# Patient Record
Sex: MALE | Race: BLACK OR AFRICAN AMERICAN | NOT HISPANIC OR LATINO | Employment: UNEMPLOYED | URBAN - METROPOLITAN AREA
[De-identification: names, ages, dates, MRNs, and addresses within clinical notes are randomized per-mention and may not be internally consistent; named-entity substitution may affect disease eponyms.]

---

## 2019-10-17 ENCOUNTER — OFFICE VISIT (OUTPATIENT)
Dept: FAMILY MEDICINE | Facility: CLINIC | Age: 34
End: 2019-10-17
Payer: MEDICAID

## 2019-10-17 VITALS
SYSTOLIC BLOOD PRESSURE: 109 MMHG | HEART RATE: 54 BPM | OXYGEN SATURATION: 98 % | WEIGHT: 181 LBS | HEIGHT: 67 IN | TEMPERATURE: 97.4 F | DIASTOLIC BLOOD PRESSURE: 59 MMHG | BODY MASS INDEX: 28.41 KG/M2

## 2019-10-17 DIAGNOSIS — G47.33 OSA (OBSTRUCTIVE SLEEP APNEA): ICD-10-CM

## 2019-10-17 DIAGNOSIS — J35.1 TONSILLAR HYPERTROPHY: ICD-10-CM

## 2019-10-17 DIAGNOSIS — H81.13 BENIGN PAROXYSMAL POSITIONAL VERTIGO DUE TO BILATERAL VESTIBULAR DISORDER: ICD-10-CM

## 2019-10-17 DIAGNOSIS — J01.10 ACUTE NON-RECURRENT FRONTAL SINUSITIS: Primary | ICD-10-CM

## 2019-10-17 PROCEDURE — 99203 OFFICE O/P NEW LOW 30 MIN: CPT | Performed by: FAMILY MEDICINE

## 2019-10-17 RX ORDER — AMOXICILLIN 875 MG
875 TABLET ORAL 2 TIMES DAILY
Qty: 28 TABLET | Refills: 0 | Status: SHIPPED | OUTPATIENT
Start: 2019-10-17 | End: 2019-10-31

## 2019-10-17 RX ORDER — PREDNISONE 20 MG/1
TABLET ORAL
Qty: 10 TABLET | Refills: 0 | Status: SHIPPED | OUTPATIENT
Start: 2019-10-17

## 2019-10-17 SDOH — HEALTH STABILITY: MENTAL HEALTH: HOW OFTEN DO YOU HAVE A DRINK CONTAINING ALCOHOL?: NEVER

## 2019-10-17 ASSESSMENT — MIFFLIN-ST. JEOR: SCORE: 1721.64

## 2019-10-17 ASSESSMENT — PAIN SCALES - GENERAL: PAINLEVEL: NO PAIN (0)

## 2019-10-17 NOTE — PATIENT INSTRUCTIONS
Patient Education     Heart Failure: Dealing with Sleep Problems     An airflow device may be needed if you have sleep apnea.   If you have heart failure and you re not sleeping well, there are many possible reasons. Many people with heart failure also have sleep apnea. This is a condition that causes snoring and brief periods of not breathing. Age, certain medicines, and not getting enough exercise can also affect sleep. Be sure to tell your healthcare provider if you re having sleep problems.  Tips for sleeping better  If shortness of breath keeps you awake, your healthcare team needs to know. Tell them if you can t lie flat or need to sleep propped up on pillows. Or tell them if you can only sleep sitting up in a chair or recliner. If nighttime shortness of breath gets worse, bring this to the team s attention. You may be accumulating fluid and need more diuretic medicine. If you have other sleep problems not related to shortness of breath, these tips may help:    Do deep breathing in bed. This will relax you and help you fall asleep.    Don t drink caffeine any later than noon.    Try to go to sleep and wake up around the same time every day. This helps your body set up a sleep cycle.    Avoid napping. This can affect your sleep cycle.    Pull window shades down. If the room isn t dark enough, get blackout shades.    Keep pets out of the bedroom if they bother you at night.    Wear comfortable, loose pajamas. Pajamas that fit tightly may make you feel like it's harder to breathe.    If you take medicines at bedtime, talk with your healthcare provider about changing this. Certain medicines may be keeping you awake. Or they may cause you to wake up often to use the bathroom.    Talk with your healthcare provider about taking over-the-counter sleep aids. Some OTC medicines can interact with your prescribed medicines. Or they may have salts in them that cause you to retain fluids.  Do you have sleep apnea?  You  may not know you have sleep apnea unless someone notices that you have irregular breathing, gasping at night, or snoring while you sleep. You should get checked for this condition. If you have it, treatment can improve your health. Treatment can also ease the stress on your heart and body.  Your healthcare provider may prescribe a CPAP?(continuous positive airway pressure) or BiPap?(bilevel positive airway pressure) device. The machine sends a gentle flow of air through a nasal mask while you sleep. This air goes through your nose and into your lungs, keeping airways open.  Tips for using CPAP and BiPap    If your mask doesn t fit or feel right, talk with your healthcare provider or the vendor about adjusting it or trying a new one.    If you have allergies or other problems that block your nose, get those treated. These devices work best if your nose is clear.    If the device doesn t feel good or work well at first, don t stop using it. Ask your provider or someone from your medical equipment company for ways to help make it work for you.    Newer devices are small, lightweight, and portable. You should take your machine with you when you travel or are not sleeping at home. Some devices have chargeable batteries. They can be taken camping or when sleeping outdoors.    Do not use tap water to fill the water chamber for humidity. Deposits in tap water can build up and affect the machine and your breathing. The product makers usually recommend that you use sterile water or distilled water.    Ask your healthcare provider if you need oxygen along with your CPAP or BiPAP machine. Some people with heart failure need both.    The equipment wears out with time. Make sure your equipment is tested. Get new equipment as often as your healthcare provider recommends.  Date Last Reviewed: 7/1/2016 2000-2018 [employer] [address] [place] [address], [place] [address]. All rights reserved. This information is not  intended as a substitute for professional medical care. Always follow your healthcare professional's instructions.           Patient Education     Obstructive Sleep Apnea  Obstructive sleep apnea is a condition that causes your air passages to become narrowed or blocked during sleep. As a result, breathing stops for short periods. Your body wakes up enough for breathing to begin again, though you don't remember it. The cycle of stopped breathing and brief awakenings can repeat dozens of times a night. This prevents the body from getting to the deeper stages of sleep that are needed for good rest and may cause your body's oxygen level to fall.  Signs of sleep apnea include loud snoring, noisy breathing, and gasping sounds during sleep. Daytime symptoms include waking up tired after a full night's sleep, waking up with headaches, feeling very sleepy or falling asleep during the day, and having problems with memory or concentration.  Risk factors for sleep apnea include:    Being overweight    Being a man, or a woman in menopause    Smoking    Using alcohol or sedating medicines    Having enlarged structures in the nose or throat  Home care  Lifestyle changes that can help treat snoring and sleep apnea include the following:    If you are overweight, lose weight. Talk to your healthcare provider about a weight-loss plan for you.    Avoid alcohol for 3 to 4 hours before bedtime. Avoid sedating medications. Ask your healthcare provider about the medicines you take.    If you smoke, talk to your healthcare provider about ways to quit.    Sleep on your side. This can help prevent gravity from pulling relaxed throat tissues into your breathing passages.    If you have allergies or sinus problems that block your nose, ask your healthcare provider for help.  Follow-up care  Follow up with your healthcare provider, or as advised. A diagnosis of sleep apnea is made with a sleep study. Your healthcare provider can tell you more  about this test.  When to seek medical advice  Sleep apnea can make you more likely to have certain health problems. These include high blood pressure, heart attack, stroke, and sexual dysfunction. If you have sleep apnea, talk to your healthcare provider about the best treatments for you.  Date Last Reviewed: 4/1/2017 2000-2018 [employer]. [address] [place] [address], [place] [address]. All rights reserved. This information is not intended as a substitute for professional medical care. Always follow your healthcare professional's instructions.

## 2019-10-17 NOTE — PROGRESS NOTES
Subjective     Stephen Schafer is a 34 year old male who presents to clinic today for the following health issues:    HPI   Patient comes in today with symptoms of spinning for the past week.  He reports he only have it in the morning when he first woke up.   He reports that he does have sinus congestion, runny nose, fullness and pressure in his ears for the past few weeks.    He denies any fever, has no cough, denies headache.    Patient reports he does snore at night.  He reports his wife does complain that he quit breathing at night.  He reports that throughout the day he is very tired and fatigued.  He does have a large tonsil.    Dizziness  Onset: About a week ago    Description:   Do you feel faint:  yes   Does it feel like the surroundings (bed, room) are moving: YES  Unsteady/off balance: no   Have you passed out or fallen: no     Intensity: moderate, severe    Progression of Symptoms:  same and intermittent    Accompanying Signs & Symptoms:  Heart palpitations: no   Nausea, vomiting: no   Weakness in arms or legs: no   Fatigue: YES  Vision or speech changes: no   Ringing in ears (Tinnitus): no   Hearing Loss: no     History:   Head trauma/concussion hx: no   Previous similar symptoms: no   Recent bleeding history: no     Precipitating factors:   Worse with activity or head movement: no   Any new medications (BP?): no   Alcohol/drug abuse/withdrawal: no     Alleviating factors:   Does staying in a fixed position give relief:  YES    Therapies Tried and outcome: Increased fluid intake and diet watch    Patient Active Problem List   Diagnosis     Benign paroxysmal positional vertigo due to bilateral vestibular disorder     LOUIE (obstructive sleep apnea)     Tonsillar hypertrophy     History reviewed. No pertinent surgical history.    Social History     Tobacco Use     Smoking status: Never Smoker     Smokeless tobacco: Never Used   Substance Use Topics     Alcohol use: Never     Frequency: Never     Family  History   Problem Relation Age of Onset     Hypertension Mother      Diabetes Type 2  Mother      Hyperlipidemia Father          Current Outpatient Medications   Medication Sig Dispense Refill     amoxicillin (AMOXIL) 875 MG tablet Take 1 tablet (875 mg) by mouth 2 times daily for 14 days 28 tablet 0     predniSONE (DELTASONE) 20 MG tablet 2 tab daily for 5 days 10 tablet 0     No Known Allergies    Reviewed and updated as needed this visit by Provider         Review of Systems   ROS COMP: Constitutional, HEENT, cardiovascular, pulmonary, gi and gu systems are negative, except as otherwise noted.      Objective    There were no vitals taken for this visit.  There is no height or weight on file to calculate BMI.  Physical Exam   GENERAL: healthy, alert and no distress  HENT: normal cephalic/atraumatic, ear canals and TM's normal, nose and mouth without ulcers or lesions and tonsillar hypertrophy  NECK: no adenopathy, no asymmetry, masses, or scars and thyroid normal to palpation  RESP: lungs clear to auscultation - no rales, rhonchi or wheezes  CV: regular rate and rhythm, normal S1 S2, no S3 or S4, no murmur, click or rub, no peripheral edema and peripheral pulses strong  ABDOMEN: soft, nontender, no hepatosplenomegaly, no masses and bowel sounds normal  MS: no gross musculoskeletal defects noted, no edema  NEURO: Normal strength and tone, mentation intact and speech normal  PSYCH: mentation appears normal, affect normal/bright    Diagnostic Test Results:  Labs reviewed in Epic  none         Assessment & Plan     1. Acute non-recurrent frontal sinusitis    - predniSONE (DELTASONE) 20 MG tablet; 2 tab daily for 5 days  Dispense: 10 tablet; Refill: 0  - amoxicillin (AMOXIL) 875 MG tablet; Take 1 tablet (875 mg) by mouth 2 times daily for 14 days  Dispense: 28 tablet; Refill: 0    2. LOUIE (obstructive sleep apnea)    - SLEEP EVALUATION & MANAGEMENT REFERRAL - [facility] [phone] (Age  "15 and up); Future    3. Benign paroxysmal positional vertigo due to bilateral vestibular disorder  Advised with home exercise, supportive care.  Increase fluid intake.  4. Tonsillar hypertrophy  She was referred to a sleep study.  If he has a positive sleep study then possibly he may benefit from tonsillectomy      BMI:   Estimated body mass index is 28.24 kg/m  as calculated from the following:    Height as of this encounter: 1.705 m (5' 7.13\").    Weight as of this encounter: 82.1 kg (181 lb).   Weight management plan: Discussed healthy diet and exercise guidelines        Patient Instructions       Patient Education     Heart Failure: Dealing with Sleep Problems     An airflow device may be needed if you have sleep apnea.   If you have heart failure and you re not sleeping well, there are many possible reasons. Many people with heart failure also have sleep apnea. This is a condition that causes snoring and brief periods of not breathing. Age, certain medicines, and not getting enough exercise can also affect sleep. Be sure to tell your healthcare provider if you re having sleep problems.  Tips for sleeping better  If shortness of breath keeps you awake, your healthcare team needs to know. Tell them if you can t lie flat or need to sleep propped up on pillows. Or tell them if you can only sleep sitting up in a chair or recliner. If nighttime shortness of breath gets worse, bring this to the team s attention. You may be accumulating fluid and need more diuretic medicine. If you have other sleep problems not related to shortness of breath, these tips may help:    Do deep breathing in bed. This will relax you and help you fall asleep.    Don t drink caffeine any later than noon.    Try to go to sleep and wake up around the same time every day. This helps your body set up a sleep cycle.    Avoid napping. This can affect your sleep cycle.    Pull window shades down. If the room isn t dark enough, get blackout " shades.    Keep pets out of the bedroom if they bother you at night.    Wear comfortable, loose pajamas. Pajamas that fit tightly may make you feel like it's harder to breathe.    If you take medicines at bedtime, talk with your healthcare provider about changing this. Certain medicines may be keeping you awake. Or they may cause you to wake up often to use the bathroom.    Talk with your healthcare provider about taking over-the-counter sleep aids. Some OTC medicines can interact with your prescribed medicines. Or they may have salts in them that cause you to retain fluids.  Do you have sleep apnea?  You may not know you have sleep apnea unless someone notices that you have irregular breathing, gasping at night, or snoring while you sleep. You should get checked for this condition. If you have it, treatment can improve your health. Treatment can also ease the stress on your heart and body.  Your healthcare provider may prescribe a CPAP?(continuous positive airway pressure) or BiPap?(bilevel positive airway pressure) device. The machine sends a gentle flow of air through a nasal mask while you sleep. This air goes through your nose and into your lungs, keeping airways open.  Tips for using CPAP and BiPap    If your mask doesn t fit or feel right, talk with your healthcare provider or the vendor about adjusting it or trying a new one.    If you have allergies or other problems that block your nose, get those treated. These devices work best if your nose is clear.    If the device doesn t feel good or work well at first, don t stop using it. Ask your provider or someone from your medical equipment company for ways to help make it work for you.    Newer devices are small, lightweight, and portable. You should take your machine with you when you travel or are not sleeping at home. Some devices have chargeable batteries. They can be taken camping or when sleeping outdoors.    Do not use tap water to fill the water  chamber for humidity. Deposits in tap water can build up and affect the machine and your breathing. The product makers usually recommend that you use sterile water or distilled water.    Ask your healthcare provider if you need oxygen along with your CPAP or BiPAP machine. Some people with heart failure need both.    The equipment wears out with time. Make sure your equipment is tested. Get new equipment as often as your healthcare provider recommends.  Date Last Reviewed: 7/1/2016 2000-2018 [employer]. [address] [place], [place] [id number]. All rights reserved. This information is not intended as a substitute for professional medical care. Always follow your healthcare professional's instructions.           Patient Education     Obstructive Sleep Apnea  Obstructive sleep apnea is a condition that causes your air passages to become narrowed or blocked during sleep. As a result, breathing stops for short periods. Your body wakes up enough for breathing to begin again, though you don't remember it. The cycle of stopped breathing and brief awakenings can repeat dozens of times a night. This prevents the body from getting to the deeper stages of sleep that are needed for good rest and may cause your body's oxygen level to fall.  Signs of sleep apnea include loud snoring, noisy breathing, and gasping sounds during sleep. Daytime symptoms include waking up tired after a full night's sleep, waking up with headaches, feeling very sleepy or falling asleep during the day, and having problems with memory or concentration.  Risk factors for sleep apnea include:    Being overweight    Being a man, or a woman in menopause    Smoking    Using alcohol or sedating medicines    Having enlarged structures in the nose or throat  Home care  Lifestyle changes that can help treat snoring and sleep apnea include the following:    If you are overweight, lose weight. Talk to your healthcare provider about a weight-loss  plan for you.    Avoid alcohol for 3 to 4 hours before bedtime. Avoid sedating medications. Ask your healthcare provider about the medicines you take.    If you smoke, talk to your healthcare provider about ways to quit.    Sleep on your side. This can help prevent gravity from pulling relaxed throat tissues into your breathing passages.    If you have allergies or sinus problems that block your nose, ask your healthcare provider for help.  Follow-up care  Follow up with your healthcare provider, or as advised. A diagnosis of sleep apnea is made with a sleep study. Your healthcare provider can tell you more about this test.  When to seek medical advice  Sleep apnea can make you more likely to have certain health problems. These include high blood pressure, heart attack, stroke, and sexual dysfunction. If you have sleep apnea, talk to your healthcare provider about the best treatments for you.  Date Last Reviewed: 4/1/2017 2000-2018 [employer]. [address] [place] [address], [place] [address]. All rights reserved. This information is not intended as a substitute for professional medical care. Always follow your healthcare professional's instructions.               Return for Physical Exam.    Cj Camargo MD  [facility]

## 2019-12-09 ENCOUNTER — PRE VISIT (OUTPATIENT)
Dept: SLEEP MEDICINE | Facility: CLINIC | Age: 34
End: 2019-12-09

## 2019-12-09 NOTE — TELEPHONE ENCOUNTER
"  1.  Reason for the visit:  Consult to discuss possible sleep apnea  2.  Referring provider and clinic name:  Dr. Raman Ng Brunson PCP  3.  Previous Sleep Doctor or Pulmonlogist (clinic name)?  None noted  4.  Records, Procedures, Imaging, and Labs (see below)  No records to obtain        All NOTES from previous office visits that pertain to why they are being seen in the Sleep Center    Previous Sleep Studies, Chest CT, Echos and reports that pertain to why they are seeing Sleep Center    All Sleep records that have been done in the last 2 years that pertain to why they are seeing Sleep Center            Are they being seen for continuation of care for Cpap/Bipap/Avap/Trilogy/Dental Device? none    If yes to above Who and Where was Device issued/currently getting supplies from? na    Are you currently on \"Supplemental Oxygen\" during the day or night?   na                                                                                                                                                      Please remind pt to bring Cpap machine and ask to arrive 15 minutes early to appointment due traffic and congestion                                                 5. Pt Sleep Center Packet received Message left asking pt to arrive 30 minutes early to appointment if no packet received.        Yes: \"please make sure that you bring this to your appointment completed, either the doctor will not see you until this completed or you may be asked to reschedule your appointment.\"     No: mail or email to the pt and explain, \"please make sure that you bring this to your appointment completed, either the doctor will not see you until this completed or you may be asked to reschedule your appointment.\"     ~If pt coming early to fill packet out, ask that they come 30 minutes prior to their appointment~     6. Has the pt's medication list been updated and preferred pharmacy added?     7. Has the allergy list been " "reviewed?    \"Thank you for choosing [facility] and we look forward to seeing you at your upcoming appointment\"     "

## 2020-01-13 ENCOUNTER — OFFICE VISIT (OUTPATIENT)
Dept: FAMILY MEDICINE | Facility: CLINIC | Age: 35
End: 2020-01-13
Payer: COMMERCIAL

## 2020-01-13 VITALS
SYSTOLIC BLOOD PRESSURE: 114 MMHG | HEIGHT: 67 IN | OXYGEN SATURATION: 100 % | DIASTOLIC BLOOD PRESSURE: 69 MMHG | TEMPERATURE: 97.8 F | HEART RATE: 54 BPM | WEIGHT: 171 LBS | BODY MASS INDEX: 26.84 KG/M2

## 2020-01-13 DIAGNOSIS — Z11.3 SCREEN FOR STD (SEXUALLY TRANSMITTED DISEASE): ICD-10-CM

## 2020-01-13 DIAGNOSIS — L30.8 OTHER ECZEMA: Primary | ICD-10-CM

## 2020-01-13 PROCEDURE — 99213 OFFICE O/P EST LOW 20 MIN: CPT | Performed by: FAMILY MEDICINE

## 2020-01-13 PROCEDURE — 87491 CHLMYD TRACH DNA AMP PROBE: CPT | Performed by: FAMILY MEDICINE

## 2020-01-13 PROCEDURE — 87591 N.GONORRHOEAE DNA AMP PROB: CPT | Performed by: FAMILY MEDICINE

## 2020-01-13 RX ORDER — TRIAMCINOLONE ACETONIDE 1 MG/G
CREAM TOPICAL
Qty: 85.2 G | Refills: 3 | Status: SHIPPED | OUTPATIENT
Start: 2020-01-13

## 2020-01-13 RX ORDER — AZITHROMYCIN 500 MG/1
1000 TABLET, FILM COATED ORAL DAILY
Qty: 2 TABLET | Refills: 0 | Status: SHIPPED | OUTPATIENT
Start: 2020-01-13 | End: 2020-01-14

## 2020-01-13 ASSESSMENT — MIFFLIN-ST. JEOR: SCORE: 1679.4

## 2020-01-13 ASSESSMENT — PAIN SCALES - GENERAL: PAINLEVEL: NO PAIN (0)

## 2020-01-13 NOTE — PROGRESS NOTES
Subjective     Stephen Schafer is a 34 year old male who presents to clinic today for the following health issues:    HPI :  Dry skin, itching in his abdomen, he has no fever no chills no recent sickness.    In addition, he reports his wife and his wife been tested positive for chlamydia he denies any symptoms.  Denies any previous sexually transmitted disease  Rash  Onset: Last week    Description:   Location: flank and abdomen  Character: round, red  Itching (Pruritis): YES    Progression of Symptoms:  improving and constant    Accompanying Signs & Symptoms:  Fever: no   Body aches or joint pain: no   Sore throat symptoms: no   Recent cold symptoms: no     History:   Previous similar rash: no     Precipitating factors:   Exposure to similar rash: no   New exposures: None   Recent travel: no     Alleviating factors:  Coconut oil    Therapies Tried and outcome: coconut oil; a little bit helpful    Patient wants to be tested for chlamydia and other STDs     Patient Active Problem List   Diagnosis     Benign paroxysmal positional vertigo due to bilateral vestibular disorder     LOUIE (obstructive sleep apnea)     Tonsillar hypertrophy     History reviewed. No pertinent surgical history.    Social History     Tobacco Use     Smoking status: Never Smoker     Smokeless tobacco: Never Used   Substance Use Topics     Alcohol use: Never     Frequency: Never     Family History   Problem Relation Age of Onset     Hypertension Mother      Diabetes Type 2  Mother      Hyperlipidemia Father          Current Outpatient Medications   Medication Sig Dispense Refill     azithromycin (ZITHROMAX) 500 MG tablet Take 2 tablets (1,000 mg) by mouth daily for 1 day 2 tablet 0     Skin Protectants, Misc. (EUCERIN) cream Apply bid as needed 454 g 3     triamcinolone (KENALOG) 0.1 % external cream Apply to area bid for 7 days, the bid as needed 85.2 g 3     predniSONE (DELTASONE) 20 MG tablet 2 tab daily for 5 days (Patient not taking:  "Reported on 1/13/2020) 10 tablet 0     No Known Allergies    Reviewed and updated as needed this visit by Provider         Review of Systems   ROS COMP: Constitutional, HEENT, cardiovascular, pulmonary, gi and gu systems are negative, except as otherwise noted.      Objective    /69 (BP Location: Right arm, Patient Position: Chair, Cuff Size: Adult Regular)   Pulse 54   Temp 97.8  F (36.6  C) (Oral)   Ht 1.71 m (5' 7.32\")   Wt 77.6 kg (171 lb)   SpO2 100%   BMI 26.53 kg/m    Body mass index is 26.53 kg/m .  Physical Exam   GENERAL: healthy, alert and no distress  SKIN: dry skin  PSYCH: mentation appears normal, affect normal/bright    Diagnostic Test Results:  Labs reviewed in Epic  Orders Placed This Encounter   Procedures     Chlamydia trachomatis PCR     Neisseria gonorrhoeae PCR           Assessment & Plan       ICD-10-CM    1. Other eczema L30.8 triamcinolone (KENALOG) 0.1 % external cream     Skin Protectants, Misc. (EUCERIN) cream   2. Screen for STD (sexually transmitted disease) Z11.3 azithromycin (ZITHROMAX) 500 MG tablet     Chlamydia trachomatis PCR     Neisseria gonorrhoeae PCR          There are no Patient Instructions on file for this visit.    Return in about 6 months (around 7/13/2020) for Physical Exam.    Cj Camargo MD  [facility]    "

## 2020-01-14 ENCOUNTER — NURSE TRIAGE (OUTPATIENT)
Dept: NURSING | Facility: CLINIC | Age: 35
End: 2020-01-14

## 2020-01-14 LAB
C TRACH DNA SPEC QL NAA+PROBE: NEGATIVE
N GONORRHOEA DNA SPEC QL NAA+PROBE: NEGATIVE
SPECIMEN SOURCE: NORMAL
SPECIMEN SOURCE: NORMAL

## 2020-01-14 NOTE — TELEPHONE ENCOUNTER
Reason for call;  Pt called for STD results - Advised test negative .   Recommendation / teaching ; FNA intro call to  for help with getting on mychart .      Caller Verbalizes understanding and denies further questions and will call back if further symptoms to triage or questions  .  Soo Gloria RN  - [facility] Nurse Advisor

## 2020-03-11 ENCOUNTER — HEALTH MAINTENANCE LETTER (OUTPATIENT)
Age: 35
End: 2020-03-11

## 2021-01-03 ENCOUNTER — HEALTH MAINTENANCE LETTER (OUTPATIENT)
Age: 36
End: 2021-01-03

## 2021-04-25 ENCOUNTER — HEALTH MAINTENANCE LETTER (OUTPATIENT)
Age: 36
End: 2021-04-25

## 2021-10-10 ENCOUNTER — HEALTH MAINTENANCE LETTER (OUTPATIENT)
Age: 36
End: 2021-10-10

## 2022-05-22 ENCOUNTER — HEALTH MAINTENANCE LETTER (OUTPATIENT)
Age: 37
End: 2022-05-22

## 2022-09-18 ENCOUNTER — HEALTH MAINTENANCE LETTER (OUTPATIENT)
Age: 37
End: 2022-09-18

## 2022-10-06 ENCOUNTER — DOCUMENTATION ONLY (OUTPATIENT)
Dept: LAB | Facility: CLINIC | Age: 37
End: 2022-10-06

## 2022-10-06 ENCOUNTER — LAB (OUTPATIENT)
Dept: LAB | Facility: CLINIC | Age: 37
End: 2022-10-06
Payer: COMMERCIAL

## 2022-10-06 DIAGNOSIS — Z00.00 ANNUAL PHYSICAL EXAM: Primary | ICD-10-CM

## 2022-10-06 DIAGNOSIS — Z00.00 ANNUAL PHYSICAL EXAM: ICD-10-CM

## 2022-10-06 LAB
BASOPHILS # BLD AUTO: 0 10E3/UL (ref 0–0.2)
BASOPHILS NFR BLD AUTO: 0 %
EOSINOPHIL # BLD AUTO: 0.4 10E3/UL (ref 0–0.7)
EOSINOPHIL NFR BLD AUTO: 5 %
ERYTHROCYTE [DISTWIDTH] IN BLOOD BY AUTOMATED COUNT: 13.1 % (ref 10–15)
HBA1C MFR BLD: 5.5 % (ref 0–5.6)
HCT VFR BLD AUTO: 41.6 % (ref 40–53)
HGB BLD-MCNC: 14.6 G/DL (ref 13.3–17.7)
LYMPHOCYTES # BLD AUTO: 2.7 10E3/UL (ref 0.8–5.3)
LYMPHOCYTES NFR BLD AUTO: 34 %
MCH RBC QN AUTO: 27.8 PG (ref 26.5–33)
MCHC RBC AUTO-ENTMCNC: 35.1 G/DL (ref 31.5–36.5)
MCV RBC AUTO: 79 FL (ref 78–100)
MONOCYTES # BLD AUTO: 0.9 10E3/UL (ref 0–1.3)
MONOCYTES NFR BLD AUTO: 11 %
NEUTROPHILS # BLD AUTO: 3.9 10E3/UL (ref 1.6–8.3)
NEUTROPHILS NFR BLD AUTO: 49 %
PLATELET # BLD AUTO: 210 10E3/UL (ref 150–450)
RBC # BLD AUTO: 5.25 10E6/UL (ref 4.4–5.9)
WBC # BLD AUTO: 7.8 10E3/UL (ref 4–11)

## 2022-10-06 PROCEDURE — 80061 LIPID PANEL: CPT

## 2022-10-06 PROCEDURE — 85025 COMPLETE CBC W/AUTO DIFF WBC: CPT

## 2022-10-06 PROCEDURE — 83036 HEMOGLOBIN GLYCOSYLATED A1C: CPT

## 2022-10-06 PROCEDURE — 36415 COLL VENOUS BLD VENIPUNCTURE: CPT

## 2022-10-06 PROCEDURE — 80053 COMPREHEN METABOLIC PANEL: CPT

## 2022-10-06 NOTE — PROGRESS NOTES
This patient has a future lab only appointment later today 10/06/2022 and needs orders. Please send orders,. Thanks deborah

## 2022-10-07 LAB
ALBUMIN SERPL-MCNC: 3.9 G/DL (ref 3.4–5)
ALP SERPL-CCNC: 64 U/L (ref 40–150)
ALT SERPL W P-5'-P-CCNC: 21 U/L (ref 0–70)
ANION GAP SERPL CALCULATED.3IONS-SCNC: 7 MMOL/L (ref 3–14)
AST SERPL W P-5'-P-CCNC: 16 U/L (ref 0–45)
BILIRUB SERPL-MCNC: 0.3 MG/DL (ref 0.2–1.3)
BUN SERPL-MCNC: 16 MG/DL (ref 7–30)
CALCIUM SERPL-MCNC: 9.5 MG/DL (ref 8.5–10.1)
CHLORIDE BLD-SCNC: 107 MMOL/L (ref 94–109)
CHOLEST SERPL-MCNC: 209 MG/DL
CO2 SERPL-SCNC: 25 MMOL/L (ref 20–32)
CREAT SERPL-MCNC: 0.76 MG/DL (ref 0.66–1.25)
FASTING STATUS PATIENT QL REPORTED: YES
GFR SERPL CREATININE-BSD FRML MDRD: >90 ML/MIN/1.73M2
GLUCOSE BLD-MCNC: 84 MG/DL (ref 70–99)
HDLC SERPL-MCNC: 65 MG/DL
LDLC SERPL CALC-MCNC: 105 MG/DL
NONHDLC SERPL-MCNC: 144 MG/DL
POTASSIUM BLD-SCNC: 4.1 MMOL/L (ref 3.4–5.3)
PROT SERPL-MCNC: 7.5 G/DL (ref 6.8–8.8)
SODIUM SERPL-SCNC: 139 MMOL/L (ref 133–144)
TRIGL SERPL-MCNC: 197 MG/DL

## 2023-06-04 ENCOUNTER — HEALTH MAINTENANCE LETTER (OUTPATIENT)
Age: 38
End: 2023-06-04

## 2024-07-14 ENCOUNTER — HEALTH MAINTENANCE LETTER (OUTPATIENT)
Age: 39
End: 2024-07-14

## 2025-07-19 ENCOUNTER — HEALTH MAINTENANCE LETTER (OUTPATIENT)
Age: 40
End: 2025-07-19